# Patient Record
Sex: MALE | Race: WHITE | URBAN - NONMETROPOLITAN AREA
[De-identification: names, ages, dates, MRNs, and addresses within clinical notes are randomized per-mention and may not be internally consistent; named-entity substitution may affect disease eponyms.]

---

## 2021-08-19 ENCOUNTER — OFFICE VISIT (OUTPATIENT)
Dept: URBAN - NONMETROPOLITAN AREA CLINIC 3 | Facility: CLINIC | Age: 86
End: 2021-08-19
Payer: COMMERCIAL

## 2021-08-19 DIAGNOSIS — E11.9 DIABETES MELLITUS TYPE 2 WITHOUT MENTION OF COMPLICATION: Primary | ICD-10-CM

## 2021-08-19 DIAGNOSIS — H40.1114 PRIMARY OPEN-ANGLE GLAUCOMA, RIGHT EYE, INDETERMINATE STAGE: ICD-10-CM

## 2021-08-19 PROCEDURE — 92134 CPTRZ OPH DX IMG PST SGM RTA: CPT | Performed by: OPTOMETRIST

## 2021-08-19 PROCEDURE — 99203 OFFICE O/P NEW LOW 30 MIN: CPT | Performed by: OPTOMETRIST

## 2021-08-19 ASSESSMENT — VISUAL ACUITY: OD: 20/30

## 2021-08-19 ASSESSMENT — INTRAOCULAR PRESSURE
OS: 12
OD: 16

## 2021-08-19 NOTE — IMPRESSION/PLAN
Impression: Diabetes mellitus Type 2 without mention of complication: Z59.6. Plan: Discussed diagnosis with patient. Discussed findings, advised to continue compliance  with  all TX. Emphasized blood sugar control.  No diabetic retinopathy, No diabetic macular edema, No neovascularization OU

## 2021-08-19 NOTE — IMPRESSION/PLAN
Impression: Nonexudative macular degeneration, intermediate dry stage, bilateral: H35.6537. Plan: Discussed diagnosis with pt and wife. Rec AREDS 2 vitamin supplements. OCT macula c/w dry AMD OD. Unable to get centered OCT OS due to fixation. Monitor 6 months.

## 2021-08-19 NOTE — IMPRESSION/PLAN
Impression: Primary open-angle glaucoma, severe stage, left eye: H40.5050. Plan: Discussed diagnosis with pt and wife. Pt had severe glaucoma with subsequent surgery (tube) but lost vision OS. Refer to glaucoma specialist. Continue with Pred Forte QD OS.

## 2021-08-19 NOTE — IMPRESSION/PLAN
Impression: Primary open-angle glaucoma, right eye, indeterminate stage: H40.1114. Plan: Discussed diagnosis with pt and wife. Continue latanoprost QHS OD.  Refer to glaucoma specialist. Attempted OCT RNFL OU but unable due to poor fixation

## 2021-11-11 ENCOUNTER — OFFICE VISIT (OUTPATIENT)
Dept: URBAN - NONMETROPOLITAN AREA CLINIC 3 | Facility: CLINIC | Age: 86
End: 2021-11-11
Payer: COMMERCIAL

## 2021-11-11 PROCEDURE — 99213 OFFICE O/P EST LOW 20 MIN: CPT | Performed by: OPTOMETRIST

## 2021-11-11 ASSESSMENT — INTRAOCULAR PRESSURE
OS: 17
OD: 30
OD: 27

## 2021-11-11 NOTE — IMPRESSION/PLAN
Impression: Nonexudative macular degeneration, intermediate dry stage, bilateral: H35.8689. Plan: Discussed diagnosis with pt and wife. Rec AREDS 2 vitamin supplements. OCT macula c/w dry AMD OD.  Monitor in 3 months

## 2021-11-11 NOTE — IMPRESSION/PLAN
Impression: Primary open-angle glaucoma, severe stage, left eye: H40.1122. Plan: Discussed diagnosis with pt and wife. Pt had severe glaucoma with subsequent surgery (tube) but lost vision OS. Keep appt with glaucoma specialist. Continue with Pred Forte QD OS.  Vision worse today OS

## 2021-11-11 NOTE — IMPRESSION/PLAN
Impression: Primary open-angle glaucoma, right eye, indeterminate stage: H40.1114. Plan: Discussed diagnosis with pt and wife. Continue latanoprost QHS OD. IOP high today OD.  Keep appt with Dr. Jamir Sultana Monday

## 2021-12-02 ENCOUNTER — OFFICE VISIT (OUTPATIENT)
Dept: URBAN - METROPOLITAN AREA CLINIC 24 | Facility: CLINIC | Age: 86
End: 2021-12-02
Payer: COMMERCIAL

## 2021-12-02 DIAGNOSIS — H35.3132 NONEXUDATIVE MACULAR DEGENERATION, INTERMEDIATE DRY STAGE, BILATERAL: ICD-10-CM

## 2021-12-02 PROCEDURE — 99204 OFFICE O/P NEW MOD 45 MIN: CPT | Performed by: OPHTHALMOLOGY

## 2021-12-02 PROCEDURE — 76514 ECHO EXAM OF EYE THICKNESS: CPT | Performed by: OPHTHALMOLOGY

## 2021-12-02 PROCEDURE — 92133 CPTRZD OPH DX IMG PST SGM ON: CPT | Performed by: OPHTHALMOLOGY

## 2021-12-02 PROCEDURE — 92020 GONIOSCOPY: CPT | Performed by: OPHTHALMOLOGY

## 2021-12-02 RX ORDER — DORZOLAMIDE HCL 20 MG/ML
2 % SOLUTION/ DROPS OPHTHALMIC
Qty: 5 | Refills: 1 | Status: ACTIVE
Start: 2021-12-02

## 2021-12-02 ASSESSMENT — INTRAOCULAR PRESSURE
OD: 36
OS: 17

## 2021-12-02 NOTE — IMPRESSION/PLAN
Impression: Primary open-angle glaucoma, right eye, indeterminate stage: H40.1114. Plan: Pt has Glaucoma    Gonio : open to cbb 2+ pigment ou      Pachs: 539/507      Today's IOP :  36/17       Tmax  :  36/17 Target IOP low to mid teens Pt denies Fhx of Glaucoma Right eye is the ONLY seeing eye (Last vf No Vf On file C/D:  0.3x0.3/0.6x0.6
OCT: (No OCT on file) Pt denies Sulfa Allergy   // Pt denies Lung /Heart dx Plan :
1. Continue Latanoprost changed to OU secondary to concern of not being correctly administered ADD Dorzolamide BID OU
and STOP Pred - secondary to not being correctly administered 2. Posterior examination, treatment is recommended for Glaucoma. IOP is too high for the level of glaucomatous damage at the present time. Recommend lowering IOP. Patient to begin Using current drop and add Dorzolamide, ERx'd as requested. Emphasized and explained compliance. Poor compliance can lead to blindness. Call with any changes in vision, sudden onset of pain or new symptoms. 3. Return in 2 months for dilated exam w/ Dr. Dylan Gonsales. (Tech to goldmann and dilate. )

## 2022-03-31 ENCOUNTER — OFFICE VISIT (OUTPATIENT)
Dept: URBAN - METROPOLITAN AREA CLINIC 24 | Facility: CLINIC | Age: 87
End: 2022-03-31
Payer: COMMERCIAL

## 2022-03-31 DIAGNOSIS — H40.1123 PRIMARY OPEN-ANGLE GLAUCOMA, SEVERE STAGE, LEFT EYE: ICD-10-CM

## 2022-03-31 PROCEDURE — 99214 OFFICE O/P EST MOD 30 MIN: CPT | Performed by: OPHTHALMOLOGY

## 2022-03-31 NOTE — IMPRESSION/PLAN
Impression: Primary open-angle glaucoma, right eye, indeterminate stage: H40.1114. Plan: Pt has Glaucoma    Gonio : open to cbb 2+ pigment ou      Pachs: 539/507      Today's IOP :  31/17       Tmax  :  36/17 Target IOP low to mid teens Pt denies Fhx of Glaucoma Right eye is the ONLY seeing eye (Last vf No Vf On file C/D:  0.3x0.3/0.6x0.6
OCT: (No OCT on file) Pt denies Sulfa Allergy   // Pt denies Lung /Heart dx Plan :
1. Continue Latanoprost changed to OU secondary to concern of not being correctly administered Dorzolamide BID OU 2. Discussed with patient and spouse that there is some concern that patient is not getting the right medicine administered at his care facility, recommend Durysta. 3. Discussed with patient the need for Durysta injection OD. Patient agrees and wishes to proceed with injection in the OR.

## 2022-07-05 ENCOUNTER — OFFICE VISIT (OUTPATIENT)
Dept: URBAN - NONMETROPOLITAN AREA CLINIC 3 | Facility: CLINIC | Age: 87
End: 2022-07-05
Payer: COMMERCIAL

## 2022-07-05 DIAGNOSIS — H35.3132 NONEXUDATIVE MACULAR DEGENERATION, INTERMEDIATE DRY STAGE, BILATERAL: ICD-10-CM

## 2022-07-05 DIAGNOSIS — E11.9 DIABETES MELLITUS TYPE 2 WITHOUT MENTION OF COMPLICATION: ICD-10-CM

## 2022-07-05 DIAGNOSIS — H40.1114 PRIMARY OPEN-ANGLE GLAUCOMA, RIGHT EYE, INDETERMINATE STAGE: Primary | ICD-10-CM

## 2022-07-05 PROCEDURE — 99213 OFFICE O/P EST LOW 20 MIN: CPT | Performed by: OPTOMETRIST

## 2022-07-05 NOTE — IMPRESSION/PLAN
Impression: Primary open-angle glaucoma, right eye, indeterminate stage: H40.1114. Plan: Pt has Glaucoma Right eye is the ONLY seeing eye 1. Continue Latanoprost QHS OU and 
 Dorzolamide BID OU

## 2022-07-05 NOTE — IMPRESSION/PLAN
Impression: Diabetes mellitus Type 2 without mention of complication: K87.7. Plan: Discussed diagnosis with patient. Discussed findings, advised to continue compliance  with  all TX. Emphasized blood sugar control.  No diabetic retinopathy, No diabetic macular edema, No neovascularization OU

## 2022-07-05 NOTE — IMPRESSION/PLAN
Impression: Nonexudative macular degeneration, intermediate dry stage, bilateral: H35.3627. Plan: Discussed diagnosis with pt and wife. Rec AREDS 2 vitamin supplements.

## 2022-10-04 ENCOUNTER — OFFICE VISIT (OUTPATIENT)
Dept: URBAN - NONMETROPOLITAN AREA CLINIC 3 | Facility: CLINIC | Age: 87
End: 2022-10-04
Payer: COMMERCIAL

## 2022-10-04 DIAGNOSIS — H35.3132 NONEXUDATIVE MACULAR DEGENERATION, INTERMEDIATE DRY STAGE, BILATERAL: ICD-10-CM

## 2022-10-04 DIAGNOSIS — H40.1114 PRIMARY OPEN-ANGLE GLAUCOMA, RIGHT EYE, INDETERMINATE STAGE: Primary | ICD-10-CM

## 2022-10-04 DIAGNOSIS — E11.9 DIABETES MELLITUS TYPE 2 WITHOUT MENTION OF COMPLICATION: ICD-10-CM

## 2022-10-04 PROCEDURE — 99212 OFFICE O/P EST SF 10 MIN: CPT | Performed by: OPTOMETRIST

## 2022-10-04 NOTE — IMPRESSION/PLAN
Impression: Diabetes mellitus Type 2 without mention of complication: W55.6. Plan: Discussed diagnosis with patient. Discussed findings, advised to continue compliance  with  all TX. Emphasized blood sugar control. pt is uncooperative today [place] NO IOP measurement, NO fundus view

## 2022-10-04 NOTE — IMPRESSION/PLAN
Impression: Primary open-angle glaucoma, right eye, indeterminate stage: H40.1114. Plan: Pt has Glaucoma Right eye is the ONLY seeing eye 1. Continue Latanoprost QHS OU and 
 Dorzolamide BID OU pt is uncooperative today José Looney NO IOP measurement, NO fundus view

 advise  Healthcare to pt drops in when pt ius reclining.  Place on inner canthus of closed eyelid if necessary

## 2022-10-04 NOTE — IMPRESSION/PLAN
Impression: Nonexudative macular degeneration, intermediate dry stage, bilateral: H35.1432. Plan: Discussed diagnosis with pt and wife. Rec AREDS 2 vitamin supplements. pt is uncooperative today Mario Galvez NO IOP measurement, NO fundus view